# Patient Record
Sex: FEMALE | Race: WHITE | NOT HISPANIC OR LATINO | Employment: FULL TIME | URBAN - METROPOLITAN AREA
[De-identification: names, ages, dates, MRNs, and addresses within clinical notes are randomized per-mention and may not be internally consistent; named-entity substitution may affect disease eponyms.]

---

## 2022-04-16 ENCOUNTER — OFFICE VISIT (OUTPATIENT)
Dept: URGENT CARE | Facility: PHYSICIAN GROUP | Age: 37
End: 2022-04-16
Payer: COMMERCIAL

## 2022-04-16 VITALS
SYSTOLIC BLOOD PRESSURE: 110 MMHG | DIASTOLIC BLOOD PRESSURE: 68 MMHG | WEIGHT: 200 LBS | OXYGEN SATURATION: 94 % | BODY MASS INDEX: 31.39 KG/M2 | RESPIRATION RATE: 20 BRPM | HEART RATE: 100 BPM | HEIGHT: 67 IN | TEMPERATURE: 98.2 F

## 2022-04-16 DIAGNOSIS — J01.00 ACUTE NON-RECURRENT MAXILLARY SINUSITIS: ICD-10-CM

## 2022-04-16 DIAGNOSIS — R09.81 COMPLAINT OF NASAL CONGESTION: ICD-10-CM

## 2022-04-16 DIAGNOSIS — H92.02 LEFT EAR PAIN: ICD-10-CM

## 2022-04-16 PROCEDURE — 99203 OFFICE O/P NEW LOW 30 MIN: CPT | Performed by: PHYSICIAN ASSISTANT

## 2022-04-16 RX ORDER — FLUOXETINE HYDROCHLORIDE 40 MG/1
80 CAPSULE ORAL DAILY
COMMUNITY

## 2022-04-16 RX ORDER — LAMOTRIGINE 150 MG/1
300 TABLET ORAL DAILY
COMMUNITY

## 2022-04-16 RX ORDER — AMOXICILLIN AND CLAVULANATE POTASSIUM 875; 125 MG/1; MG/1
1 TABLET, FILM COATED ORAL 2 TIMES DAILY
Qty: 10 TABLET | Refills: 0 | Status: SHIPPED | OUTPATIENT
Start: 2022-04-16 | End: 2022-04-21

## 2022-04-16 NOTE — PROGRESS NOTES
"Subjective:   Jillian Diaz is a 37 y.o. female who presents for Sinus Problem (Left side ace pressure; 3x days )     Patient presents with chief complaint of left-sided facial pain, ear pain, jaw pain, nasal congestion.  3 days duration.  No obvious fever.  Is a smoker.  No cough.  Has tried Advil.  No other at home remedies.  No shortness of breath or chest pain.  Has had sinusitis in the past       Medications:  fluoxetine  lamotrigine    Allergies:             Penicillins    Surgical History:       No past surgical history on file.    Past Social Hx:  Jillian Diaz  reports that she has never smoked. She has never used smokeless tobacco. She reports previous alcohol use. She reports previous drug use.     Past Family Hx:   Jillian Diaz family history is not on file.       Problem list, medications, and allergies reviewed by myself today in Epic.     Objective:     /68 (BP Location: Right arm, Patient Position: Sitting, BP Cuff Size: Adult)   Pulse 100   Temp 36.8 °C (98.2 °F) (Temporal)   Resp 20   Ht 1.702 m (5' 7\")   Wt 90.7 kg (200 lb)   SpO2 94%   BMI 31.32 kg/m²     Physical Exam  Vitals reviewed.   Constitutional:       General: She is not in acute distress.     Appearance: She is well-developed. She is not ill-appearing, toxic-appearing or diaphoretic.   HENT:      Head: Normocephalic.      Right Ear: Ear canal and external ear normal. Tympanic membrane is injected.      Left Ear: Ear canal and external ear normal. Tympanic membrane is injected.      Nose: Mucosal edema and rhinorrhea present.      Right Sinus: Maxillary sinus tenderness and frontal sinus tenderness present.      Left Sinus: Maxillary sinus tenderness and frontal sinus tenderness present.      Mouth/Throat:      Mouth: Mucous membranes are dry.      Pharynx: Uvula midline. Posterior oropharyngeal erythema present. No uvula swelling.   Eyes:      Conjunctiva/sclera: Conjunctivae normal.      Pupils: Pupils are equal, round, and " reactive to light.   Cardiovascular:      Rate and Rhythm: Normal rate and regular rhythm.   Pulmonary:      Effort: Pulmonary effort is normal. No accessory muscle usage or respiratory distress.      Breath sounds: Normal breath sounds. No decreased breath sounds, wheezing, rhonchi or rales.   Musculoskeletal:         General: Normal range of motion.      Cervical back: Normal range of motion and neck supple.   Lymphadenopathy:      Cervical: No cervical adenopathy.   Skin:     General: Skin is warm and dry.   Neurological:      Mental Status: She is alert and oriented to person, place, and time.   Psychiatric:         Behavior: Behavior is cooperative.         Assessment/Plan:     Diagnosis and Associated Orders:     1. Complaint of nasal congestion    2. Left ear pain    3. Acute non-recurrent maxillary sinusitis  - amoxicillin-clavulanate (AUGMENTIN) 875-125 MG Tab; Take 1 Tablet by mouth 2 times a day for 5 days.  Dispense: 10 Tablet; Refill: 0    Other orders  - lamotrigine (LAMICTAL) 150 MG tablet; Take 300 mg by mouth every day.  - fluoxetine (PROZAC) 40 MG capsule; Take 80 mg by mouth every day.        Comments/MDM:    Patient presents with symptoms consistent with sinusitis.  Likely viral at this time.  Did antibiotic prescription given, criteria for taking discussed.  Symptomatic treatment as below.    -Increase water intake  -May use over the counter Ibuprofen/Tylenol as needed for any fever, body aches or throat pain  -May take long acting antihistamine for seasonal allergy symptoms and post-nasal drip as needed  -May use over the counter saline nasal spray for nasal lavage for nasal congestion as needed  -May use over the counter Nasacort/Flonase for nasal congestion as needed   -May use throat lozenges for throat discomfort as needed   -May gargle with salt water up to 4x/day as needed for throat discomfort (1 tsp salt dissolved in 1 cup warm water)  -Monitor for increased sinus pain/pressure with  sinus congestion with thick mucus production, sinus headache, cough, shortness of breath, fever- need re-evaluation    I personally reviewed prior external notes and test results pertinent to today's visit.  Red flags discussed as well as indications to present to the Emergency Department.  Supportive care, natural history, differential diagnoses, and indications for immediate follow-up discussed.  Patient expresses understanding and agrees to plan.  Patient denies any other questions or concerns.    Follow-up with the primary care physician for recheck, reevaluation, and consideration of further management.      Please note that this dictation was created using voice recognition software. I have made a reasonable attempt to correct obvious errors, but I expect that there are errors of grammar and possibly content that I did not discover before finalizing the note.    This note was electronically signed by Eve Kwan PA-C

## 2023-04-02 ENCOUNTER — OFFICE VISIT (OUTPATIENT)
Dept: URGENT CARE | Facility: CLINIC | Age: 38
End: 2023-04-02

## 2023-04-02 VITALS
HEIGHT: 67 IN | SYSTOLIC BLOOD PRESSURE: 124 MMHG | WEIGHT: 205 LBS | HEART RATE: 106 BPM | RESPIRATION RATE: 16 BRPM | OXYGEN SATURATION: 95 % | BODY MASS INDEX: 32.18 KG/M2 | TEMPERATURE: 98.8 F | DIASTOLIC BLOOD PRESSURE: 74 MMHG

## 2023-04-02 DIAGNOSIS — J02.9 SORE THROAT: ICD-10-CM

## 2023-04-02 DIAGNOSIS — J02.0 STREP PHARYNGITIS: ICD-10-CM

## 2023-04-02 LAB — S PYO DNA SPEC NAA+PROBE: DETECTED

## 2023-04-02 PROCEDURE — 99213 OFFICE O/P EST LOW 20 MIN: CPT

## 2023-04-02 PROCEDURE — 87651 STREP A DNA AMP PROBE: CPT

## 2023-04-02 RX ORDER — AZITHROMYCIN 500 MG/1
500 TABLET, FILM COATED ORAL DAILY
Qty: 5 TABLET | Refills: 0 | Status: SHIPPED | OUTPATIENT
Start: 2023-04-02 | End: 2023-04-07

## 2023-04-02 ASSESSMENT — ENCOUNTER SYMPTOMS
NAUSEA: 0
COUGH: 0
CHILLS: 1
FEVER: 1
SORE THROAT: 1
VOMITING: 0

## 2023-04-02 NOTE — PROGRESS NOTES
"Subjective:     CHIEF COMPLAINT  Chief Complaint   Patient presents with    Pharyngitis     Started Friday night, \"Very swollen and painful.\"        HPI  Jillian Diaz is a very pleasant 38 y.o. female who presents with a sore throat since Friday night with body aches, chills, and feeling feverish. She is eating and drinking normally. She denies N/V and has not had a cough.     REVIEW OF SYSTEMS  Review of Systems   Constitutional:  Positive for chills, fever and malaise/fatigue.   HENT:  Positive for ear pain and sore throat. Negative for congestion.    Respiratory:  Negative for cough.    Cardiovascular:  Negative for chest pain.   Gastrointestinal:  Negative for nausea and vomiting.     PAST MEDICAL HISTORY  There are no problems to display for this patient.      SURGICAL HISTORY  patient denies any surgical history    ALLERGIES  Allergies   Allergen Reactions    Penicillins Rash     2004-11-10;itching         CURRENT MEDICATIONS  Home Medications       Reviewed by Adriana Pozo P.A.-C. (Physician Assistant) on 04/02/23 at 0949  Med List Status: <None>     Medication Last Dose Status   fluoxetine (PROZAC) 40 MG capsule Taking Active   lamotrigine (LAMICTAL) 150 MG tablet Taking Active                    SOCIAL HISTORY  Social History     Tobacco Use    Smoking status: Never    Smokeless tobacco: Never   Vaping Use    Vaping Use: Every day    Substances: Nicotine, Flavoring    Devices: Disposable, Pre-filled or refillable cartridge   Substance and Sexual Activity    Alcohol use: Not Currently    Drug use: Not Currently    Sexual activity: Not on file       FAMILY HISTORY  History reviewed. No pertinent family history.       Objective:     VITAL SIGNS: /74 (BP Location: Left arm, Patient Position: Sitting, BP Cuff Size: Adult)   Pulse (!) 106   Temp 37.1 °C (98.8 °F) (Temporal)   Resp 16   Ht 1.702 m (5' 7\")   Wt 93 kg (205 lb)   LMP 03/20/2023 (Approximate)   SpO2 95%   Breastfeeding No   BMI " 32.11 kg/m²     PHYSICAL EXAM  Physical Exam  Constitutional:       General: She is not in acute distress.     Appearance: Normal appearance. She is normal weight. She is ill-appearing.   HENT:      Head: Normocephalic and atraumatic.      Right Ear: Tympanic membrane, ear canal and external ear normal.      Left Ear: Tympanic membrane, ear canal and external ear normal.      Ears:      Comments: Small fluid line behind each TM       Nose: Nose normal.      Mouth/Throat:      Mouth: Mucous membranes are moist.      Pharynx: Posterior oropharyngeal erythema present. No oropharyngeal exudate.      Comments: Petechia on palate  Eyes:      Conjunctiva/sclera: Conjunctivae normal.   Cardiovascular:      Rate and Rhythm: Normal rate and regular rhythm.      Heart sounds: Normal heart sounds.   Pulmonary:      Effort: Pulmonary effort is normal. No respiratory distress.      Breath sounds: Normal breath sounds. No wheezing, rhonchi or rales.   Skin:     General: Skin is warm and dry.   Neurological:      General: No focal deficit present.      Mental Status: She is alert and oriented to person, place, and time.   Psychiatric:         Mood and Affect: Mood normal.       Assessment/Plan:     1. Sore throat  - POCT CEPHEID GROUP A STREP - PCR    2. Strep pharyngitis  - azithromycin (ZITHROMAX) 500 MG tablet; Take 1 Tablet by mouth every day for 5 days.  Dispense: 5 Tablet; Refill: 0    Results:  Strep positive    MDM/Comments:  Patient has stable vital signs and is non-toxic appearing. Discussed supportive care with hydration, rest, Tylenol/Ibuprofen as needed. Strep testing done in office. Discussed use of warm salt water gargles for sore throat, warm tea, warm soup, maintaining good hydration. Azithromycin for strep pharyngitis due to penicillin allergy.     Differential diagnosis, natural history, supportive care, and indications for immediate follow-up discussed. All questions answered. Patient agrees with the plan of  care.    Follow-up as needed if symptoms worsen or fail to improve to PCP, Urgent care or Emergency Room.    I have personally reviewed prior external notes and test results pertinent to today's visit.  I have independently reviewed and interpreted all diagnostics ordered during this urgent care acute visit.   Discussed management options (risks,benefits, and alternatives to treatment). Pt expresses understanding and the treatment plan was agreed upon. Questions were encouraged and answered to pt's satisfaction.    Please note that this dictation was created using voice recognition software. I have made a reasonable attempt to correct obvious errors, but I expect that there are errors of grammar and possibly content that I did not discover before finalizing the note.

## 2023-07-24 ENCOUNTER — NON-PROVIDER VISIT (OUTPATIENT)
Dept: OCCUPATIONAL MEDICINE | Facility: CLINIC | Age: 38
End: 2023-07-24

## 2023-07-24 DIAGNOSIS — Z11.1 ENCOUNTER FOR PPD TEST: Primary | ICD-10-CM

## 2023-07-24 PROCEDURE — 86580 TB INTRADERMAL TEST: CPT | Performed by: NURSE PRACTITIONER

## 2024-11-19 ENCOUNTER — OFFICE VISIT (OUTPATIENT)
Dept: URGENT CARE | Facility: CLINIC | Age: 39
End: 2024-11-19
Payer: COMMERCIAL

## 2024-11-19 VITALS
HEIGHT: 67 IN | HEART RATE: 81 BPM | OXYGEN SATURATION: 96 % | TEMPERATURE: 98.1 F | SYSTOLIC BLOOD PRESSURE: 102 MMHG | DIASTOLIC BLOOD PRESSURE: 68 MMHG | WEIGHT: 186 LBS | BODY MASS INDEX: 29.19 KG/M2

## 2024-11-19 DIAGNOSIS — S46.912A STRAIN OF LEFT SHOULDER, INITIAL ENCOUNTER: ICD-10-CM

## 2024-11-19 PROCEDURE — 99213 OFFICE O/P EST LOW 20 MIN: CPT | Mod: 25 | Performed by: NURSE PRACTITIONER

## 2024-11-19 PROCEDURE — 96372 THER/PROPH/DIAG INJ SC/IM: CPT | Performed by: NURSE PRACTITIONER

## 2024-11-19 RX ORDER — KETOROLAC TROMETHAMINE 15 MG/ML
15 INJECTION, SOLUTION INTRAMUSCULAR; INTRAVENOUS ONCE
Status: COMPLETED | OUTPATIENT
Start: 2024-11-19 | End: 2024-11-19

## 2024-11-19 RX ADMIN — KETOROLAC TROMETHAMINE 15 MG: 15 INJECTION, SOLUTION INTRAMUSCULAR; INTRAVENOUS at 09:53

## 2024-11-19 ASSESSMENT — PAIN SCALES - GENERAL: PAINLEVEL_OUTOF10: 10=SEVERE PAIN

## 2024-11-19 NOTE — PROGRESS NOTES
"Jillian Diaz is a 39 y.o. female who presents for Shoulder Pain (Denies injury, thinks maybe she slept on it x last night. Limited ROM)      HPI  This is a new problem. Jillian Diaz is a 39 y.o. patient who presents to urgent care with c/o: Woke up this morning with left shoulder pain.  She has limited range of motion her shoulder.  She went to bed feeling fine.  She does think she might of slept harder than usual last night.  She has never felt this before.  Treatments tried: None.  No other aggravating leaving factors.  Denies numbness or tingling sensation.  Denies trauma or unusual activity.    ROS See HPI    Allergies:       Allergies   Allergen Reactions    Penicillins Rash     2004-11-10;itching         PMSFS Hx:  No past medical history on file.  No past surgical history on file.  No family history on file.  Social History     Tobacco Use    Smoking status: Never    Smokeless tobacco: Never   Substance Use Topics    Alcohol use: Not Currently         Problems:   There is no problem list on file for this patient.      Medications:   Current Outpatient Medications on File Prior to Visit   Medication Sig Dispense Refill    lamotrigine (LAMICTAL) 150 MG tablet Take 300 mg by mouth every day.      fluoxetine (PROZAC) 40 MG capsule Take 80 mg by mouth every day.       No current facility-administered medications on file prior to visit.        Objective:     /68 (BP Location: Right arm, Patient Position: Sitting, BP Cuff Size: Adult)   Pulse 81   Temp 36.7 °C (98.1 °F) (Temporal)   Ht 1.702 m (5' 7\")   Wt 84.4 kg (186 lb)   SpO2 96%   BMI 29.13 kg/m²     Physical Exam  Vitals and nursing note reviewed.   Constitutional:       General: She is not in acute distress.     Appearance: Normal appearance. She is well-developed. She is not toxic-appearing.   HENT:      Head: Normocephalic.   Cardiovascular:      Rate and Rhythm: Normal rate and regular rhythm.      Pulses: Normal pulses.   Pulmonary: "      Effort: Pulmonary effort is normal.   Musculoskeletal:      Left shoulder: Tenderness (Generalized pain with movement) present. No swelling, deformity, effusion, bony tenderness or crepitus. Decreased range of motion. Normal strength. Normal pulse.      Left upper arm: Normal.   Skin:     General: Skin is warm and dry.      Capillary Refill: Capillary refill takes less than 2 seconds.   Neurological:      General: No focal deficit present.      Mental Status: She is alert and oriented to person, place, and time.   Psychiatric:         Mood and Affect: Mood normal.         Behavior: Behavior normal. Behavior is cooperative.         Thought Content: Thought content normal.         Assessment /Associated Orders:      1. Strain of left shoulder, initial encounter  ketorolac (Toradol) 15 MG/ML injection 15 mg          Medical Decision Making:    Jillian Diaz is a very pleasant 39 y.o. female who is clinically stable at today's acute urgent care visit.    No acute distress is noted.  VSS. Appropriate for outpatient care at this time.   Acute problem today with uncertain prognosis.     Toradol given in clinic today. Tolerated well without adverse effects. Advised not to take NSAIDS for 6-8 hours post injection.   OTC  analgesic of choice (acetaminophen or NSAID) prn pain. Follow manufactures dosing and safety precautions.   Ice/ heat pack prn pain  Gentle ROM exercises - start tomorrow.       Through shared decision making a discussion of the Dx and DDx, management options (risks,benefits, and alternatives to planned treatment), natural progression and supportive care.  Expressed understanding and the treatment plan was agreed upon.   Questions were encouraged and answered   Return to urgent care prn if new or worsening sx or if there is no improvement in condition prn.            Please note that this dictation was created using voice recognition software. I have worked with consultants from the vendor as well  as technical experts from [facility] to optimize the interface. I have made every reasonable attempt to correct obvious errors, but I expect that there are errors of grammar and possibly content that I did not discover before finalizing the note.  This note was electronically signed by provider